# Patient Record
Sex: FEMALE | Race: WHITE | RURAL
[De-identification: names, ages, dates, MRNs, and addresses within clinical notes are randomized per-mention and may not be internally consistent; named-entity substitution may affect disease eponyms.]

---
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
## 2020-04-01 ENCOUNTER — OFFICE VISIT (OUTPATIENT)
Dept: PRIMARY CARE CLINIC | Age: 20
End: 2020-04-01

## 2020-04-01 ENCOUNTER — TELEPHONE (OUTPATIENT)
Dept: PRIMARY CARE CLINIC | Age: 20
End: 2020-04-01

## 2020-04-01 VITALS — RESPIRATION RATE: 18 BRPM | TEMPERATURE: 98.3 F | HEART RATE: 62 BPM | OXYGEN SATURATION: 99 %

## 2020-04-01 DIAGNOSIS — Z20.822 EXPOSURE TO COVID-19 VIRUS: Primary | ICD-10-CM

## 2020-04-01 DIAGNOSIS — J02.9 SORE THROAT: ICD-10-CM

## 2020-04-01 DIAGNOSIS — R05.9 COUGH: ICD-10-CM

## 2020-04-01 LAB
BINAX NOW INFLUENZA: NEGATIVE
S PYO AG THROAT QL: NEGATIVE
VALID INTERNAL CONTROL?: YES
VALID INTERNAL CONTROL?: YES

## 2020-04-01 NOTE — TELEPHONE ENCOUNTER
Returned call. Stefan Mendez is on patient's disclosure. He says the patient's mother is about to have a cardiac cath today and the cardiologist was wanting to know if pt had been tested for Covid. I explained that we did not test her because that the only patients who are being tested right now are the ones who are in the hospital. But I also told him that there is a very good chance she has it due to her symptoms and exposure to a Covid positive person so we should treat her as if she does have it. Mr. Mancil Severance verbalized understanding.

## 2020-04-01 NOTE — PROGRESS NOTES
Chief Complaint   Patient presents with    Cough    Fever    Concern For COVID-19 (Coronavirus)     exposure to positive covid case     Visit Vitals  Pulse 62   Temp 98.3 °F (36.8 °C) (Oral)   Resp 18   SpO2 99%

## 2022-11-17 ENCOUNTER — OFFICE VISIT (OUTPATIENT)
Dept: ORTHOPEDIC SURGERY | Age: 22
End: 2022-11-17
Payer: COMMERCIAL

## 2022-11-17 VITALS — WEIGHT: 125 LBS | BODY MASS INDEX: 22.15 KG/M2 | HEIGHT: 63 IN

## 2022-11-17 DIAGNOSIS — M25.521 RIGHT ELBOW PAIN: ICD-10-CM

## 2022-11-17 DIAGNOSIS — G56.21 NEURITIS OF RIGHT ULNAR NERVE: Primary | ICD-10-CM

## 2022-11-17 PROCEDURE — 99203 OFFICE O/P NEW LOW 30 MIN: CPT | Performed by: ORTHOPAEDIC SURGERY

## 2022-11-17 RX ORDER — LORAZEPAM 0.5 MG/1
TABLET ORAL
COMMUNITY
Start: 2022-11-15

## 2022-11-17 RX ORDER — METHYLPREDNISOLONE 4 MG/1
TABLET ORAL
Qty: 1 DOSE PACK | Refills: 0 | Status: SHIPPED | OUTPATIENT
Start: 2022-11-17

## 2022-11-17 RX ORDER — DULOXETIN HYDROCHLORIDE 30 MG/1
CAPSULE, DELAYED RELEASE ORAL
COMMUNITY
Start: 2022-11-15

## 2022-11-17 NOTE — PROGRESS NOTES
HPI: Chela Yun (: 2000) is a 25 y.o. female, patient, here for evaluation of the following chief complaint(s):    Arm Pain (H/o right cubital tunnel syndrome, dynamic. EMG normal. Started Summer 2021, gradually worsening especially recently with hauling trees. Right hand dominant.)  Patient seen today to evaluate her right arm. 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 She has described numbness, tingling and paresthesias following her right arm ulnar distribution extending into the small and ring finger since the summer 2021. There had been no fall or other injury. She had been evaluated at [facility]. Electrodiagnostic EMG assessment was normal.  She was advised to treat her right arm conservatively and at 1 point did take a Medrol Dosepak. She is right-hand dominant. 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 She denies any left arm problems. Vitals:  Ht 5' 2.5\" (1.588 m)   Wt 125 lb (56.7 kg)   BMI 22.50 kg/m²    Body mass index is 22.5 kg/m². Allergies   Allergen Reactions    Latex Hives    Amoxicillin Hives    Wellbutrin [Bupropion] Hives       Current Outpatient Medications   Medication Sig    DULoxetine (CYMBALTA) 30 mg capsule     LORazepam (ATIVAN) 0.5 mg tablet     methylPREDNISolone (MEDROL DOSEPACK) 4 mg tablet Per dose pack instructions     No current facility-administered medications for this visit.        Past Medical History:   Diagnosis Date    Ankle sprain 10/12/2011    right    Community acquired pneumonia     Contusion 11    Fatigue 13    Finger injury     r hand    Head injury 11    Injury, finger 2012    right hand    Migraine         Past Surgical History:   Procedure Laterality Date    HX WRIST FRACTURE TX Left        Family History   Problem Relation Age of Onset    Thyroid Disease Mother     Heart Disease Father     Thyroid Disease Maternal Grandfather     Heart Disease Paternal Grandfather     Diabetes Paternal Grandmother     Diabetes Other         Social History     Tobacco Use    Smoking status: Never    Smokeless tobacco: Never   Substance Use Topics    Alcohol use: No    Drug use: No        Review of Systems   All other systems reviewed and are negative. Physical Exam    Both elbows demonstrate full range of motion and there is a hint of subluxation of the right ulnar nerve but not on the left. She has a very positive Tinel's at the right cubital tunnel producing paresthesias in the ulnar distribution. She does have good intrinsic strength bilaterally and subjectively seems to have intact sensation ulnarly to slight diminished on the right. Imaging:    No new x-rays today. ASSESSMENT/PLAN:  Below is the assessment and plan developed based on review of pertinent history, physical exam, labs, studies, and medications. Patient examination was consistent with right elbow ulnar neuritis possible dynamic ulnar nerve entrapment syndrome. Electrodiagnostic assessment has been found to be negative and she has treated this conservatively. 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 She is concerned as her next physician will be hauling [place] trees. She feels that she has exhausted her conservative treatment choices although agrees to retrial a Medrol Dosepak. Explained to her that given the subtle occasional subluxation felt in the clinic that she indeed could undergo a right elbow subcutaneous anterior ulnar nerve transposition. I reviewed risks that include but not limited to stiffness, pain, nerve or tendon damage and overall incomplete relief of pain and paresthesias.  Arrangements can made for this to be performed on an outpatient basis at her convenience. 1. Neuritis of right ulnar nerve  -     methylPREDNISolone (MEDROL DOSEPACK) 4 mg tablet; Per dose pack instructions, Normal, Disp-1 Dose Pack, R-0  2. Right elbow pain      Return for Follow-up 7 to 10 days after surgery. .    An electronic signature was used to authenticate this note.   -- Jules Polk MD

## 2022-11-18 DIAGNOSIS — G56.21 NEURITIS OF RIGHT ULNAR NERVE: Primary | ICD-10-CM

## 2022-12-07 DIAGNOSIS — G56.21 NEURITIS OF RIGHT ULNAR NERVE: Primary | ICD-10-CM

## 2022-12-07 RX ORDER — HYDROCODONE BITARTRATE AND ACETAMINOPHEN 5; 325 MG/1; MG/1
1 TABLET ORAL
Qty: 15 TABLET | Refills: 0 | Status: SHIPPED | OUTPATIENT
Start: 2022-12-07 | End: 2022-12-14

## 2022-12-14 NOTE — PROGRESS NOTES
HPI: Aranza Mack (: 2000) is a 25 y.o. female, patient, here for evaluation of the following chief complaint(s):    Surgical Follow-up (Pt is here to follow up on her surgery done right elbow ulnar nerve transposition on 22.)  Patient seen today to evaluate her right arm. 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 She has described numbness, tingling and paresthesias following her right arm ulnar distribution extending into the small and ring finger since the summer . There had been no fall or other injury. She had been evaluated at [facility]. Electrodiagnostic EMG assessment was normal.  She was advised to treat her right arm conservatively and at 1 point did take a Medrol Dosepak. She is right-hand dominant. 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 She denies any left arm problems. She underwent a right elbow ulnar nerve transposition on 2022. Intraoperatively there was evidence of subluxation. Vitals:  Ht 5' 2.5\" (1.588 m)   Wt 125 lb (56.7 kg)   BMI 22.50 kg/m²    Body mass index is 22.5 kg/m². Allergies   Allergen Reactions    Latex Hives    Amoxicillin Hives    Wellbutrin [Bupropion] Hives       Current Outpatient Medications   Medication Sig    DULoxetine (CYMBALTA) 30 mg capsule     LORazepam (ATIVAN) 0.5 mg tablet     methylPREDNISolone (MEDROL DOSEPACK) 4 mg tablet Per dose pack instructions     No current facility-administered medications for this visit.        Past Medical History:   Diagnosis Date    Ankle sprain 10/12/2011    right    Community acquired pneumonia     Contusion 11    Fatigue 13    Finger injury [id number]    r hand    Head injury 11    Injury, finger 2012    right hand    Migraine         Past Surgical History:   Procedure Laterality Date HX WRIST FRACTURE TX Left        Family History   Problem Relation Age of Onset    Thyroid Disease Mother     Heart Disease Father     Thyroid Disease Maternal Grandfather     Heart Disease Paternal Grandfather     Diabetes Paternal Grandmother     Diabetes Other         Social History     Tobacco Use    Smoking status: Never    Smokeless tobacco: Never   Substance Use Topics    Alcohol use: No    Drug use: No        Review of Systems   All other systems reviewed and are negative. Physical Exam    Both elbows demonstrate full range of motion and there is a hint of subluxation of the right ulnar nerve but not on the left. She has a very positive Tinel's at the right cubital tunnel producing paresthesias in the ulnar distribution. She does have good intrinsic strength bilaterally and subjectively seems to have intact sensation ulnarly to slight diminished on the right. Postoperatively than the right elbow wound is healing well there is no redness drainage or sign of infection. Sensation as well as intrinsic strength remain intact. Imaging:    No new x-rays today. ASSESSMENT/PLAN:  Below is the assessment and plan developed based on review of pertinent history, physical exam, labs, studies, and medications. Patient examination was consistent with right elbow ulnar neuritis possible dynamic ulnar nerve entrapment syndrome. Electrodiagnostic assessment has been found to be negative and she has treated this conservatively. 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 She is concerned as her next physician will be [employer] [place]. She feels that she has exhausted her conservative treatment choices although agrees to retrial a Medrol Dosepak.   Explained to her that given the subtle occasional subluxation felt in the clinic that she indeed could undergo a right elbow subcutaneous anterior ulnar nerve transposition. She indeed underwent the right elbow subcutaneous anterior ulnar nerve transposition on 12/8/2022. Continue with wound care, gentle motion and strengthening. Thus far she is very pleased with her recovery and states that she is \"doing well\". Continue with home motion stretching and strengthening and she plans to return in 4 to 6 weeks. No complaints of left arm. 1. Neuritis of right ulnar nerve  2. Right elbow pain  3. Status post decompression of ulnar nerve      Return in about 6 weeks (around 1/26/2023). An electronic signature was used to authenticate this note.   -- Edilson Blake MD

## 2022-12-15 ENCOUNTER — OFFICE VISIT (OUTPATIENT)
Dept: ORTHOPEDIC SURGERY | Age: 22
End: 2022-12-15
Payer: COMMERCIAL

## 2022-12-15 VITALS — HEIGHT: 63 IN | BODY MASS INDEX: 22.15 KG/M2 | WEIGHT: 125 LBS

## 2022-12-15 DIAGNOSIS — Z98.890 STATUS POST DECOMPRESSION OF ULNAR NERVE: ICD-10-CM

## 2022-12-15 DIAGNOSIS — G56.21 NEURITIS OF RIGHT ULNAR NERVE: Primary | ICD-10-CM

## 2022-12-15 DIAGNOSIS — M25.521 RIGHT ELBOW PAIN: ICD-10-CM

## 2022-12-15 PROCEDURE — 99024 POSTOP FOLLOW-UP VISIT: CPT | Performed by: ORTHOPAEDIC SURGERY

## 2022-12-15 NOTE — PATIENT INSTRUCTIONS
Elbow: Exercises  Introduction  Here are some examples of exercises for you to try. The exercises may be suggested for a condition or for rehabilitation. Start each exercise slowly. Ease off the exercises if you start to have pain. You will be told when to start these exercises and which ones will work best for you. How to do the exercises  Wrist flexor stretch  Extend your arm in front of you with your palm up. Bend your wrist, pointing your hand toward the floor. With your other hand, gently bend your wrist farther until you feel a mild to moderate stretch in your forearm. Hold for at least 15 to 30 seconds. Repeat 2 to 4 times. Wrist extensor stretch  Repeat steps 1 to 4 of the stretch above but begin with your extended hand palm down. Ball or sock squeeze  Hold a tennis ball (or a rolled-up sock) in your hand. Make a fist around the ball (or sock) and squeeze. Hold for about 6 seconds, and then relax for up to 10 seconds. Repeat 8 to 12 times. Switch the ball (or sock) to your other hand and do 8 to 12 times. Wrist deviation  Sit so that your arm is supported but your hand hangs off the edge of a flat surface, such as a table. Hold your hand out like you are shaking hands with someone. Move your hand up and down. Repeat this motion 8 to 12 times. Switch arms. Try to do this exercise twice with each hand. Wrist curls  Place your forearm on a table with your hand hanging over the edge of the table, palm up. Place a 1- to 2-pound weight in your hand. This may be a dumbbell, a can of food, or a filled water bottle. Slowly raise and lower the weight while keeping your forearm on the table and your palm facing up. Repeat this motion 8 to 12 times. Switch arms, and do steps 1 through 4. Repeat with your hand facing down toward the floor. Switch arms. Biceps curls  Sit leaning forward with your legs slightly spread and your left hand on your left thigh.   Place your right elbow on your right thigh, and hold the weight with your forearm horizontal.  Slowly curl the weight up and toward your chest.  Repeat this motion 8 to 12 times. Switch arms, and do steps 1 through 4. Follow-up care is a key part of your treatment and safety. Be sure to make and go to all appointments, and call your doctor if you are having problems. It's also a good idea to know your test results and keep a list of the medicines you take. Current as of: March 9, 2022               Content Version: 13.4  © 2006-2022 [employer]. Care instructions adapted under license by [employer] (which disclaims liability or warranty for this information). If you have questions about a medical condition or this instruction, always ask your healthcare professional. [address] any warranty or liability for your use of this information.

## 2023-01-19 ENCOUNTER — OFFICE VISIT (OUTPATIENT)
Dept: ORTHOPEDIC SURGERY | Age: 23
End: 2023-01-19
Payer: COMMERCIAL

## 2023-01-19 DIAGNOSIS — G56.21 NEURITIS OF RIGHT ULNAR NERVE: Primary | ICD-10-CM

## 2023-01-19 DIAGNOSIS — Z98.890 STATUS POST DECOMPRESSION OF ULNAR NERVE: ICD-10-CM

## 2023-01-19 DIAGNOSIS — M25.521 RIGHT ELBOW PAIN: ICD-10-CM

## 2023-01-19 PROCEDURE — 99024 POSTOP FOLLOW-UP VISIT: CPT | Performed by: ORTHOPAEDIC SURGERY

## 2023-01-19 NOTE — PATIENT INSTRUCTIONS
Elbow: Exercises  Introduction  Here are some examples of exercises for you to try. The exercises may be suggested for a condition or for rehabilitation. Start each exercise slowly. Ease off the exercises if you start to have pain. You will be told when to start these exercises and which ones will work best for you. How to do the exercises  Wrist flexor stretch  Extend your arm in front of you with your palm up. Bend your wrist, pointing your hand toward the floor. With your other hand, gently bend your wrist farther until you feel a mild to moderate stretch in your forearm. Hold for at least 15 to 30 seconds. Repeat 2 to 4 times. Wrist extensor stretch  Repeat steps 1 to 4 of the stretch above but begin with your extended hand palm down. Ball or sock squeeze  Hold a tennis ball (or a rolled-up sock) in your hand. Make a fist around the ball (or sock) and squeeze. Hold for about 6 seconds, and then relax for up to 10 seconds. Repeat 8 to 12 times. Switch the ball (or sock) to your other hand and do 8 to 12 times. Wrist deviation  Sit so that your arm is supported but your hand hangs off the edge of a flat surface, such as a table. Hold your hand out like you are shaking hands with someone. Move your hand up and down. Repeat this motion 8 to 12 times. Switch arms. Try to do this exercise twice with each hand. Wrist curls  Place your forearm on a table with your hand hanging over the edge of the table, palm up. Place a 1- to 2-pound weight in your hand. This may be a dumbbell, a can of food, or a filled water bottle. Slowly raise and lower the weight while keeping your forearm on the table and your palm facing up. Repeat this motion 8 to 12 times. Switch arms, and do steps 1 through 4. Repeat with your hand facing down toward the floor. Switch arms. Biceps curls  Sit leaning forward with your legs slightly spread and your left hand on your left thigh.   Place your right elbow on your right thigh, and hold the weight with your forearm horizontal.  Slowly curl the weight up and toward your chest.  Repeat this motion 8 to 12 times. Switch arms, and do steps 1 through 4. Follow-up care is a key part of your treatment and safety. Be sure to make and go to all appointments, and call your doctor if you are having problems. It's also a good idea to know your test results and keep a list of the medicines you take. Current as of: March 9, 2022               Content Version: 13.4  © 2006-2022 [employer]. Care instructions adapted under license by [employer] (which disclaims liability or warranty for this information). If you have questions about a medical condition or this instruction, always ask your healthcare professional. [address] any warranty or liability for your use of this information.

## 2023-01-19 NOTE — PROGRESS NOTES
HPI: Jude Adkins (: 2000) is a 25 y.o. female, patient, here for evaluation of the following chief complaint(s):    No chief complaint on file. Patient seen today to evaluate her right arm. 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 She has described numbness, tingling and paresthesias following her right arm ulnar distribution extending into the small and ring finger since the summer 2021. There had been no fall or other injury. She had been evaluated at [email or web address]. Electrodiagnostic EMG assessment was normal.  She was advised to treat her right arm conservatively and at 1 point did take a Medrol Dosepak. She is right-hand dominant. 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 She denies any left arm problems. She underwent a right elbow ulnar nerve transposition on 2022. Intraoperatively there was evidence of subluxation. Vitals: There were no vitals taken for this visit. There is no height or weight on file to calculate BMI. Allergies   Allergen Reactions    Latex Hives    Amoxicillin Hives    Wellbutrin [Bupropion] Hives       Current Outpatient Medications   Medication Sig    DULoxetine (CYMBALTA) 30 mg capsule     LORazepam (ATIVAN) 0.5 mg tablet     methylPREDNISolone (MEDROL DOSEPACK) 4 mg tablet Per dose pack instructions     No current facility-administered medications for this visit.        Past Medical History:   Diagnosis Date    Ankle sprain 10/12/2011    right    Community acquired pneumonia     Contusion 11    Fatigue 13    Finger injury [id number]    r hand    Head injury 11    Injury, finger 2012    right hand    Migraine         Past Surgical History:   Procedure Laterality Date    HX WRIST FRACTURE TX Left        Family History   Problem Relation Age of Onset Thyroid Disease Mother     Heart Disease Father     Thyroid Disease Maternal Grandfather     Heart Disease Paternal Grandfather     Diabetes Paternal Grandmother     Diabetes Other         Social History     Tobacco Use    Smoking status: Never    Smokeless tobacco: Never   Substance Use Topics    Alcohol use: No    Drug use: No        Review of Systems   All other systems reviewed and are negative. Physical Exam    Both elbows demonstrate full range of motion and there is a hint of subluxation of the right ulnar nerve but not on the left. She has a very positive Tinel's at the right cubital tunnel producing paresthesias in the ulnar distribution. She does have good intrinsic strength bilaterally and subjectively seems to have intact sensation ulnarly to slight diminished on the right. Postoperatively right elbow wound is well-healed. There is no redness drainage or sign of infection. There is normal sensation and intrinsic strength. No subluxation. Imaging:    No new x-rays today. ASSESSMENT/PLAN:  Below is the assessment and plan developed based on review of pertinent history, physical exam, labs, studies, and medications. Patient examination was consistent with right elbow ulnar neuritis possible dynamic ulnar nerve entrapment syndrome. Electrodiagnostic assessment has been found to be negative and she has treated this conservatively. 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 She feels that she has exhausted her conservative treatment choices although agrees to retrial a Medrol Dosepak. Explained to her that given the subtle occasional subluxation felt in the clinic that she indeed could undergo a right elbow subcutaneous anterior ulnar nerve transposition.    She indeed underwent the right elbow subcutaneous anterior ulnar nerve transposition on 12/8/2022. Overall she remains very pleased with her recovery and is doing well. She has excellent motion but still technically lacks about 5 to 10 degrees of perfect elbow extension. She will work with home exercises along with strengthening to her tolerance. She has good sensation and intrinsic strength. I can see her in 6 weeks or anytime for further treatment. She currently is no complaints with the left arm. 1. Neuritis of right ulnar nerve  2. Status post decompression of ulnar nerve  3. Right elbow pain      No follow-ups on file. An electronic signature was used to authenticate this note.   -- Rinku Ferreira MD